# Patient Record
Sex: FEMALE | Race: WHITE | NOT HISPANIC OR LATINO | URBAN - METROPOLITAN AREA
[De-identification: names, ages, dates, MRNs, and addresses within clinical notes are randomized per-mention and may not be internally consistent; named-entity substitution may affect disease eponyms.]

---

## 2022-10-04 ENCOUNTER — OFFICE VISIT (OUTPATIENT)
Dept: URGENT CARE | Facility: CLINIC | Age: 27
End: 2022-10-04

## 2022-10-04 VITALS
TEMPERATURE: 97.1 F | OXYGEN SATURATION: 99 % | WEIGHT: 112 LBS | HEART RATE: 70 BPM | HEIGHT: 64 IN | RESPIRATION RATE: 16 BRPM | BODY MASS INDEX: 19.12 KG/M2 | SYSTOLIC BLOOD PRESSURE: 106 MMHG | DIASTOLIC BLOOD PRESSURE: 61 MMHG

## 2022-10-04 DIAGNOSIS — R07.81 RIB PAIN ON RIGHT SIDE: ICD-10-CM

## 2022-10-04 DIAGNOSIS — B34.9 VIRAL SYNDROME: Primary | ICD-10-CM

## 2022-10-04 DIAGNOSIS — N92.6 MENSTRUAL PERIOD LATE: ICD-10-CM

## 2022-10-04 LAB
SARS-COV-2 AG UPPER RESP QL IA: NEGATIVE
SL AMB POCT URINE HCG: NEGATIVE
VALID CONTROL: NORMAL

## 2022-10-04 PROCEDURE — 81025 URINE PREGNANCY TEST: CPT | Performed by: PHYSICIAN ASSISTANT

## 2022-10-04 PROCEDURE — 87811 SARS-COV-2 COVID19 W/OPTIC: CPT | Performed by: PHYSICIAN ASSISTANT

## 2022-10-04 PROCEDURE — 99213 OFFICE O/P EST LOW 20 MIN: CPT | Performed by: PHYSICIAN ASSISTANT

## 2022-10-04 NOTE — LETTER
October 4, 2022     Patient: Rahat Chavez   YOB: 1995   Date of Visit: 10/4/2022       To Whom It May Concern: It is my medical opinion that Rahat Chavez should remain out of work until 10/6/2022             Sincerely,        Karolyn Owens PA-C

## 2022-10-04 NOTE — PROGRESS NOTES
[address]        NAME: Alis Perez is a 32 y o  female  : 1995    MRN: [id number]  DATE: 2022  TIME: 10:15 AM    Assessment and Plan   Viral syndrome [B34 9]  1  Viral syndrome  Poct Covid 19 Rapid Antigen Test   2  Rib pain on right side     3  Menstrual period late  POCT urine HCG     Advised directly to ER if symptoms worsen    Patient Instructions   Over-the-counter cold and flu  Flonase  Drink plenty fluids  Saltwater gargles  Warm tea with honey  Follow up with PCP in 3-5 days  Proceed to  ER if symptoms worsen  Chief Complaint     Chief Complaint   Patient presents with    Diarrhea     Intermittent diarrhea x 2 days with nausea, h/a and feeling feverish  Last night had rlq pain and right sided chest pain  Also c/o stuffy nose and sinus pressure         History of Present Illness       Patient is a 59-year-old female with significant past medical history of smoking presents the office complaining of subjective fevers and chills, headache, congestion, postnasal drip, and nausea few days  She also had some loose stools and mild pain to her right lower quadrant described as sore which only occurs with ambulation  States she also has some pain to the right side of her chest/ribs which is tender to palpation  Denies chest pain, shortness of breath, palpitations, vomiting, or urinary symptoms  States history of COVID-19 in the past with similar symptoms but states not as severe  Reports her mensus is late  Review of Systems   Review of Systems   Constitutional: Positive for chills, fatigue and fever  HENT: Positive for congestion, postnasal drip, rhinorrhea and sore throat  Respiratory: Negative for cough and shortness of breath  Cardiovascular: Positive for chest pain  Negative for palpitations  Gastrointestinal: Positive for diarrhea and nausea  Negative for abdominal pain and vomiting     Genitourinary: Positive for menstrual problem (late mensus)  Negative for dysuria, frequency, hematuria, urgency and vaginal discharge  Musculoskeletal: Negative for myalgias  Neurological: Positive for headaches  Negative for dizziness and light-headedness  Current Medications     No current outpatient medications on file  Current Allergies     Allergies as of 10/04/2022    (No Known Allergies)            The following portions of the patient's history were reviewed and updated as appropriate: allergies, current medications, past family history, past medical history, past social history, past surgical history and problem list      Past Medical History:   Diagnosis Date    Known health problems: none        Past Surgical History:   Procedure Laterality Date    NO PAST SURGERIES         Family History   Problem Relation Age of Onset    Arthritis Mother     Diabetes Father     Strabismus Father          Medications have been verified  Objective   /61   Pulse 70   Temp (!) 97 1 °F (36 2 °C)   Resp 16   Ht 5' 4" (1 626 m)   Wt 50 8 kg (112 lb)   LMP 09/02/2022   SpO2 99%   BMI 19 22 kg/m²   Patient's last menstrual period was 09/02/2022  Physical Exam     Physical Exam  Vitals and nursing note reviewed  Constitutional:       Appearance: Normal appearance  She is well-developed  HENT:      Head: Normocephalic and atraumatic  Right Ear: Tympanic membrane, ear canal and external ear normal       Left Ear: Tympanic membrane, ear canal and external ear normal       Nose: Congestion present  Mouth/Throat:      Pharynx: Uvula midline  Comments: Post nasal drip  Eyes:      General: Lids are normal       Conjunctiva/sclera: Conjunctivae normal       Pupils: Pupils are equal, round, and reactive to light  Cardiovascular:      Rate and Rhythm: Normal rate and regular rhythm  Pulses: Normal pulses  Heart sounds: Normal heart sounds  No murmur heard  No friction rub  No gallop     Pulmonary:      Effort: Pulmonary effort is normal       Breath sounds: Normal breath sounds  No wheezing, rhonchi or rales  Chest:      Chest wall: Tenderness present  Abdominal:      General: Bowel sounds are normal       Palpations: Abdomen is soft  Tenderness: There is abdominal tenderness ("sore") in the right lower quadrant  There is no guarding or rebound  Negative signs include McBurney's sign  Musculoskeletal:         General: Normal range of motion  Cervical back: Neck supple  Lymphadenopathy:      Cervical: No cervical adenopathy  Skin:     General: Skin is warm and dry  Capillary Refill: Capillary refill takes less than 2 seconds  Neurological:      Mental Status: She is alert         POC rapid COVID-19 negative    POC preg negative

## 2023-01-27 ENCOUNTER — AMB VIDEO VISIT (OUTPATIENT)
Dept: OTHER | Facility: HOSPITAL | Age: 28
End: 2023-01-27

## 2023-01-27 DIAGNOSIS — R50.9 FEVER, UNSPECIFIED FEVER CAUSE: ICD-10-CM

## 2023-01-27 DIAGNOSIS — R10.31 RLQ ABDOMINAL PAIN: Primary | ICD-10-CM

## 2023-01-27 DIAGNOSIS — K52.9 GASTROENTERITIS: ICD-10-CM

## 2023-01-27 NOTE — CARE ANYWHERE EVISITS
Visit Summary for [facility]   EMILIANO - Gender: Female - Date of Birth: [id number]  Date: [id number] - Duration: 30 minutes  Patient: [facility]   Khalif Call  Provider: Mirian Amador PA-C    Patient Contact Information  Address  [address] [place] [id number]  [phone]    Visit Topics  Stomachache [Added By: Self - 2023-01-27]  Headache [Added By: Self - 2023-01-27]  Earache [Added By: Self - 2023-01-27]  Cold [Added By: Self - 2023-01-27]  Fever [Added By: Self - [id number]]    Triage Questions   What is your current physical address in the event of a medical emergency? Answer []  Are you allergic to any medications? Answer []  Are you now or could you be pregnant? Answer []  Do you have any immune system compromise or chronic lung   disease? Answer []  Do you have any vulnerable family members in the home (infant, pregnant, cancer, elderly)? Answer []     Conversation Transcripts  [0A][0A] [Notification] You are connected with Mirian Amador PA-C, Urgent Care Specialist [0A][Notification] Natasha Steve is located in [place]  [0A][Notification] Natasha Steve has shared health history  Katelyn Almaraz  [0A]    Diagnosis  Abdominal and pelvic pain  Infectious gastroenteritis and colitis, unspecified    Procedures  Value: [id number] Code: CPT-4 UNLISTED E&M SERVICE    Medications Prescribed    No prescriptions ordered    Electronically signed by: Archana Lema(NPI [id number])

## 2023-01-27 NOTE — PROGRESS NOTES
Video Visit - Catrachita Oneal 32 y o  female MRN: [id number]    REQUIRED DOCUMENTATION:         1  This service was provided via [facility]  2  Provider located at [address] [place] [address] [id number] [phone]  3  [facility] provider: Steven Naqvi PA-C   4  Identify all parties in room with patient during [facility] visit:  No one else  5  After connecting through [employer], patient was identified by name and date of birth  Patient was then informed that this was a Telemedicine visit and that the exam was being conducted confidentially over secure lines  My office door was closed  No one else was in the room  Patient acknowledged consent and understanding of privacy and security of the Telemedicine visit  I informed the patient that I have reviewed their record in Epic and presented the opportunity for them to ask any questions regarding the visit today  The patient agreed to participate  VITALS: Heart Rate: 83 BPM, Respiratory Rate: 12 RPM, Temperature 98 5° F, Blood Pressure Unavailable mmHg, Pulse Ox Unavailable % on RA    HPI  Pt reports called out of work this morning  Yesterday was feeling fine  She had sushi (salmon and tuna tartar) last night then at 2300 had fever tmax 101 (resolved), HA, earache in L ear (now resolved), low abdominal cramping, nausea, diarrhea last night  Reports about 5 episodes of diarrhea  L retro-orbital HA pressure 2/10 worse with eye strain and photophobia  No blood or mucous  Able to drink today  Decreased appetite  Diarrhea seems to have resolved  Reports urine was cloudy and yellow today  Denies dysuria  "Last time that happened I had a UTI " Reviewed record- UC showed 100K colonies skin amada  Physical Exam  Constitutional:       General: She is not in acute distress  Appearance: Normal appearance  She is underweight  She is not ill-appearing or toxic-appearing  HENT:      Head: Normocephalic and atraumatic        Nose: No rhinorrhea  Mouth/Throat:      Mouth: Mucous membranes are moist    Eyes:      Conjunctiva/sclera: Conjunctivae normal    Pulmonary:      Effort: Pulmonary effort is normal  No respiratory distress  Breath sounds: No wheezing (no gross audible wheeze through computer)  Abdominal:      Tenderness: There is abdominal tenderness (mild) in the right lower quadrant  Comments: "Soreness" to RLQ with jumping   Musculoskeletal:      Cervical back: Normal range of motion  Skin:     Coloration: Skin is pale (naturally)  Findings: No rash (on face or neck)  Neurological:      Mental Status: She is alert  Cranial Nerves: No dysarthria or facial asymmetry  Psychiatric:         Mood and Affect: Mood normal          Behavior: Behavior normal        Likely food poisoning, resolving  However, with RLQ pain, fevers, diarrhea, decreased appetite, concern for appy  Tried to order stat US for pt  Called central scheduling  Pt without insurance at the moment and doesn't want to pay out of pocket  Strict ER precautions discussed with patient  Diagnoses and all orders for this visit:    RLQ abdominal pain  -     Cancel: US appendix; Future  -     Cancel: UA w Reflex to Microscopic w Reflex to Culture -Lab Collect; Future    Gastroenteritis    Fever, unspecified fever cause      Patient Instructions   note in "Letters" section of [employer]  Can print if opened from a [employer] Phone number is [phone] if you need assistance or have further questions  ER for worsening pain or fever  Nutrition Tips for Relief of Diarrhea   WHAT YOU NEED TO KNOW:   There are diet changes you can make to help relieve or stop diarrhea  These changes include limiting or avoiding foods and liquids that are high in sugar, fat, fiber, and lactose  Lactose is a sugar found in milk products  Milk products can cause diarrhea in people who are lactose intolerant   You should also drink extra liquids to replace fluids that are lost when you have diarrhea  Diarrhea can lead to dehydration  DISCHARGE INSTRUCTIONS:   Foods to limit or avoid:   1  Dairy:      ? Whole milk    ? Half-and-half, cream, and sour cream    ? Regular (whole milk) ice cream    2  Grains:      ? Whole wheat and whole grain breads, pasta, cereals, and crackers    ? Jason Makua and wild rice    ? Breads and cereals with seeds or nuts    ? Popcorn    3  Fruit and vegetables:      ? All raw fruits, except bananas and melon    ? Dried fruits, including prunes and raisins    ? Canned fruit in heavy syrup    ? Prune juice and any fruit juice with pulp    ? Raw vegetables, except lettuce     ? Fried vegetables    ? Corn, raw and cooked broccoli, cabbage, cauliflower, and reji greens    4  Protein:      ? Fried meat, poultry, and fish    ? High-fat luncheon meats, such as bologna    ? Fatty meats, such as sausage, singh, and hot dogs    ? Beans and nuts    5  Liquids:      ? Sodas and fruit-flavored drinks    ? Drinks that contain caffeine, such as energy drinks, coffee, and tea     ? Drinks that contain alcohol or sugar alcohol, such as sorbitol    Foods and liquids you may eat or drink:  Most people can tolerate the foods and liquids listed below  If any of them make your symptoms worse, stop eating or drinking them until you feel better  If you are lactose intolerant, avoid milk products  1  Dairy:      ? Skim or low-fat milk or evaporated milk    ? Soy milk or buttermilk     ? Low-fat, part-skim, and aged cheese    ? Yogurt, low-fat ice cream, or sherbert    2  Grains:  (Choose foods with less than 2 grams of dietary fiber per serving )     ? White or refined flour breads, bagels, pasta, and crackers    ? Cold or hot cereals made from white or refined flour such as puffed rice, cornflakes, or cream of wheat    ? White rice    3  Fruit and vegetables:      ? Bananas or melon    ? Fruit juice without pulp, except prune juice    ?  Canned fruit in juice or light syrup    ? Lettuce and most well-cooked vegetables without seeds or skins     ? Strained vegetable juice    4  Protein:      ? Tender, well-cooked meat, poultry, or fish    ? Well-cooked eggs or soy foods (cooked without added fat)    ? Smooth nut butters    5  Fats:  (Limit fats to less than 8 teaspoons a day)     ? Oil, butter, or margarine, or mayonnaise    ? Cream cheese or salad dressings    6  Liquids:      ? For infants, breast milk or formula    ? Oral rehydration solution     ? Decaffeinated coffee or caffeine-free teas    ? Soft drinks without caffeine    Other guidelines to follow:   · Drink liquids as directed  You may need to drink more liquids than usual to prevent dehydration  Ask how much liquid to drink each day and which liquids are best for you  You may need to drink an oral rehydration solution (ORS)  An ORS helps replace fluids and electrolytes that you lose when you have diarrhea  · Eat small meals or snacks every 3 to 4 hours  instead of large meals  Continue eating even if you still have diarrhea  Your diarrhea will continue for a few days but should gradually go away  © Copyright [employer] 2022 Information is for End User's use only and may not be sold, redistributed or otherwise used for commercial purposes  All illustrations and images included in CareNotes® are the copyrighted property of [employer]  or Agustin Edmondson  The above information is an  only  It is not intended as medical advice for individual conditions or treatments  Talk to your doctor, nurse or pharmacist before following any medical regimen to see if it is safe and effective for you

## 2023-01-27 NOTE — PATIENT INSTRUCTIONS
note in "Letters" section of [employer] patrice  Can print if opened from a [facility] [address] Phone number is [phone] if you need assistance or have further questions  ER for worsening pain or fever  Nutrition Tips for Relief of Diarrhea   WHAT YOU NEED TO KNOW:   There are diet changes you can make to help relieve or stop diarrhea  These changes include limiting or avoiding foods and liquids that are high in sugar, fat, fiber, and lactose  Lactose is a sugar found in milk products  Milk products can cause diarrhea in people who are lactose intolerant  You should also drink extra liquids to replace fluids that are lost when you have diarrhea  Diarrhea can lead to dehydration  DISCHARGE INSTRUCTIONS:   Foods to limit or avoid:   Dairy:      Whole milk    Half-and-half, cream, and sour cream    Regular (whole milk) ice cream    Grains:      Whole wheat and whole grain breads, pasta, cereals, and crackers    Brown and wild rice    Breads and cereals with seeds or nuts    Popcorn    Fruit and vegetables: All raw fruits, except bananas and melon    Dried fruits, including prunes and raisins    Canned fruit in heavy syrup    Prune juice and any fruit juice with pulp    Raw vegetables, except lettuce     Fried vegetables    Corn, raw and cooked broccoli, cabbage, cauliflower, and reji greens    Protein:      Fried meat, poultry, and fish    High-fat luncheon meats, such as bologna    Fatty meats, such as sausage, singh, and hot dogs    Beans and nuts    Liquids:      Sodas and fruit-flavored drinks    Drinks that contain caffeine, such as energy drinks, coffee, and tea     Drinks that contain alcohol or sugar alcohol, such as sorbitol    Foods and liquids you may eat or drink:  Most people can tolerate the foods and liquids listed below  If any of them make your symptoms worse, stop eating or drinking them until you feel better  If you are lactose intolerant, avoid milk products    Dairy:      Skim or low-fat milk or evaporated milk    Soy milk or buttermilk     Low-fat, part-skim, and aged cheese    Yogurt, low-fat ice cream, or sherbert    Grains:  (Choose foods with less than 2 grams of dietary fiber per serving )     White or refined flour breads, bagels, pasta, and crackers    Cold or hot cereals made from white or refined flour such as puffed rice, cornflakes, or cream of wheat    White rice    Fruit and vegetables:      Bananas or melon    Fruit juice without pulp, except prune juice    Canned fruit in juice or light syrup    Lettuce and most well-cooked vegetables without seeds or skins     Strained vegetable juice    Protein:      Tender, well-cooked meat, poultry, or fish    Well-cooked eggs or soy foods (cooked without added fat)    Smooth nut butters    Fats:  (Limit fats to less than 8 teaspoons a day)     Oil, butter, or margarine, or mayonnaise    Cream cheese or salad dressings    Liquids: For infants, breast milk or formula    Oral rehydration solution     Decaffeinated coffee or caffeine-free teas    Soft drinks without caffeine    Other guidelines to follow:   Drink liquids as directed  You may need to drink more liquids than usual to prevent dehydration  Ask how much liquid to drink each day and which liquids are best for you  You may need to drink an oral rehydration solution (ORS)  An ORS helps replace fluids and electrolytes that you lose when you have diarrhea  Eat small meals or snacks every 3 to 4 hours  instead of large meals  Continue eating even if you still have diarrhea  Your diarrhea will continue for a few days but should gradually go away  © Copyright [employer] 2022 Information is for End User's use only and may not be sold, redistributed or otherwise used for commercial purposes  All illustrations and images included in CareNotes® are the copyrighted property of [employer]  or Agustin Matias   The above information is an  only   It is not intended as medical advice for individual conditions or treatments  Talk to your doctor, nurse or pharmacist before following any medical regimen to see if it is safe and effective for you

## 2023-01-27 NOTE — LETTER
[facility] VISIT VIR  [address] [id number]    January 27, 2023     Patient: Yancy Thomas   YOB: 1995   Date of Visit: 1/27/2023       To Whom it May Concern:    Yancy Thomas is under my professional care  She was seen virtually on 1/27/2023  She may return to work on 1/28/23  If you have any questions or concerns, please don't hesitate to call           Sincerely,          Kerrie Herrera PA-C        CC: No Recipients

## 2023-11-21 ENCOUNTER — OFFICE VISIT (OUTPATIENT)
Dept: OBGYN CLINIC | Facility: CLINIC | Age: 28
End: 2023-11-21

## 2023-11-21 VITALS
TEMPERATURE: 97.4 F | SYSTOLIC BLOOD PRESSURE: 98 MMHG | OXYGEN SATURATION: 98 % | HEART RATE: 90 BPM | DIASTOLIC BLOOD PRESSURE: 68 MMHG | HEIGHT: 64 IN | WEIGHT: 115.2 LBS | BODY MASS INDEX: 19.67 KG/M2

## 2023-11-21 DIAGNOSIS — Z31.69 INFERTILITY COUNSELING: ICD-10-CM

## 2023-11-21 DIAGNOSIS — Z01.419 ENCOUNTER FOR ANNUAL ROUTINE GYNECOLOGICAL EXAMINATION: Primary | ICD-10-CM

## 2023-11-21 DIAGNOSIS — N90.7 LABIAL CYST: ICD-10-CM

## 2023-11-21 NOTE — ASSESSMENT & PLAN NOTE
- Discussed ACOG guidelines for pap smear screening frequency: not indicated today. - Discussed healthy lifestyle recommendations for diet, exercise and self breast awareness.  - Discussed ACOG recommendations for screening mammograms: not indicated today. - Discussed age based recommendations for adequate calcium and vitamin D intake. No additional osteoporosis screening indicated at this time. - Discussed ACOG recommendations for colon cancer screening: not indicated at this time. - Safe sex practices were discussed and STI testing was not desired by the patient  - Contraceptive options were reviewed: n/a desires to conceive   - Routine follow up in 1 year was recommended or sooner as needed. All questions and concerns were addressed.

## 2023-11-21 NOTE — ASSESSMENT & PLAN NOTE
Given multiple years of unprotected intercourse, recommend GRACIA evaluation - contact information provided

## 2023-11-21 NOTE — PATIENT INSTRUCTIONS
Recommended the following vulvar hygiene measures:  Avoid scented products (pads, tampons, soaps, detergents, perfumes, sprays, bubble baths, wipes)  Avoid unnecessary prolonged usage of pads  Wear cotton underwear and avoid tight fitting pants/tights/leotards  Change out of damp exercise clothes and swimsuits as soon as possible  Sleep without underwear  No douching  If you use the Always brand pad, try switching to another brand or tampons instead - go with all cotton/natural products such as Seventh Generation and Natracare  Do not use a washcloth or soap directly on the vagina      [facility] of [address]. [address] [place] [address] Road, [address] [phone]  [address] [place] [address], [address] [address]  [employer]    [facility]  [address] [facility], [address] [facility], [address] [place] [address]  [phone]  [employer]

## 2023-11-21 NOTE — PROGRESS NOTES
Prachi Wilcox is a 29 y.o. female who presents for annual exam.      Chief Complaint   Patient presents with    [facility]     New pt/annual exam; pt states last PAP was done @ [facility] about 1 yr ago, negative; pt states that she has a cyst in labial area, not causing any pain or irritation;       - left labial cyst: Present for about 3 years, not painful, stable size, not draining  - Cycles vary in heaviness - sometimes heavy with clots, sometimes light spotting. Cycle is regular overall. Lasting 4-5 days. Heaviness started in past few years   - Desires to conceive -  has been 3-4 years of consistent unprotected intercourse with this partner (different from FOB of her 3 children). Current partner denies any prior pregnancies with other partners. Healthy other than chronic back pain - takes tramadol. Denies erectile or ejaculatory dysfunction. Vapes but does not smoke cigarettes, no drug use. Reports when he was in the [address] about a year ago his PCP ordered a semen analysis which he was told was normal   - pt with h/o chlamydia when pregnant with her youngest child (). Denies any issues conceiving at that point       Last [facility]: [facility]       HPV vaccine completed:yes   Current contraception: none  History of abnormal Pap smear: no  History of abnormal mammogram: no      Family history of uterine or ovarian cancer: no  Family history of breast cancer: no  Family history of colon cancer: no      Menstrual History:  OB History          3    Para   3    Term   3            AB        Living   3         SAB        IAB        Ectopic        Multiple        Live Births   3                    Patient's last menstrual period was 2023 (exact date).          Past Medical History:   Diagnosis Date    Known health problems: none      Past Surgical History:   Procedure Laterality Date    NO PAST SURGERIES       Family History   Problem Relation Age of Onset    Arthritis Mother     Hypertension Father     Diabetes Father     Strabismus Father     Lung cancer Paternal Grandmother     Skin cancer Paternal Grandfather        Social History     Tobacco Use    Smoking status: Every Day     Types: E-Cigarettes    Smokeless tobacco: Former   Vaping Use    Vaping Use: Some days    Substances: Nicotine, Flavoring   Substance Use Topics    Alcohol use: Yes     Comment: rare    Drug use: Never        No current outpatient medications on file. No Known Allergies        Review of Systems   Constitutional:  Negative for appetite change, chills and fever. Eyes:  Negative for visual disturbance. Respiratory:  Negative for cough, chest tightness and shortness of breath. Cardiovascular:  Negative for chest pain. Gastrointestinal:  Negative for abdominal distention, abdominal pain, constipation, diarrhea, nausea and vomiting. Endocrine: Negative for cold intolerance and heat intolerance. Genitourinary:  Negative for difficulty urinating, dyspareunia, dysuria, frequency, genital sores, pelvic pain, urgency, vaginal bleeding, vaginal discharge and vaginal pain. Musculoskeletal:  Negative for arthralgias. Neurological:  Negative for light-headedness and headaches. Hematological:  Does not bruise/bleed easily. Psychiatric/Behavioral:  Negative for behavioral problems. All other systems reviewed and are negative. BP 98/68 (BP Location: Right arm, Patient Position: Sitting, Cuff Size: Adult)   Pulse 90   Temp (!) 97.4 °F (36.3 °C) (Tympanic)   Ht 5' 4" (1.626 m)   Wt 52.3 kg (115 lb 3.2 oz)   LMP 11/13/2023 (Exact Date)   SpO2 98%   BMI 19.77 kg/m²         Physical Exam  Constitutional:       General: She is not in acute distress. Appearance: Normal appearance. Genitourinary:      Vulva, bladder and urethral meatus normal.      No lesions in the vagina.       Genitourinary Comments: Upper labia minora with 0.5 cm pedunculated cystic lesion, nontender       Right Labia: No rash, tenderness, lesions or skin changes. Left Labia: lesions. Left Labia: No tenderness, skin changes or rash. No labial fusion noted. No inguinal adenopathy present in the right or left side. No vaginal discharge, erythema, tenderness, bleeding or ulceration. No vaginal prolapse present. Right Adnexa: not tender, not full and no mass present. Left Adnexa: not tender, not full and no mass present. No cervical motion tenderness, discharge, friability, lesion or polyp. Uterus is not enlarged, fixed, tender or irregular. No uterine mass detected. Uterus is anteverted. Pelvic exam was performed with patient in the lithotomy position. Breasts:     Right: No swelling, bleeding, inverted nipple, mass, nipple discharge, skin change or tenderness. Left: No swelling, bleeding, inverted nipple, mass, nipple discharge, skin change or tenderness. HENT:      Head: Normocephalic and atraumatic. Neck:      Thyroid: No thyromegaly. Cardiovascular:      Rate and Rhythm: Normal rate and regular rhythm. Pulmonary:      Effort: Pulmonary effort is normal. No accessory muscle usage or respiratory distress. Abdominal:      General: There is no distension. Palpations: Abdomen is soft. Tenderness: There is no abdominal tenderness. There is no guarding or rebound. Musculoskeletal:         General: Normal range of motion. Cervical back: Normal range of motion and neck supple. Lymphadenopathy:      Upper Body:      Right upper body: No supraclavicular or axillary adenopathy. Left upper body: No supraclavicular or axillary adenopathy. Lower Body: No right inguinal and no right inguinal adenopathy. No left inguinal and no left inguinal adenopathy. Neurological:      General: No focal deficit present. Mental Status: She is alert. Skin:     General: Skin is warm and dry. Findings: No erythema. Psychiatric:         Mood and Affect: Mood normal.         Behavior: Behavior normal.   Vitals and nursing note reviewed. Exam conducted with a chaperone present. Encounter for annual routine gynecological examination  - Discussed ACOG guidelines for pap smear screening frequency: not indicated today. - Discussed healthy lifestyle recommendations for diet, exercise and self breast awareness.  - Discussed ACOG recommendations for screening mammograms: not indicated today. - Discussed age based recommendations for adequate calcium and vitamin D intake. No additional osteoporosis screening indicated at this time. - Discussed ACOG recommendations for colon cancer screening: not indicated at this time. - Safe sex practices were discussed and STI testing was not desired by the patient  - Contraceptive options were reviewed: n/a desires to conceive   - Routine follow up in 1 year was recommended or sooner as needed. All questions and concerns were addressed.        Infertility counseling  Given multiple years of unprotected intercourse, recommend [facility] evaluation - contact information provided     Labial cyst  Small pedunculated cystic lesion on left labia, not bothersome to pt, reassurance provided